# Patient Record
Sex: FEMALE | Race: WHITE | NOT HISPANIC OR LATINO | Employment: UNEMPLOYED | URBAN - METROPOLITAN AREA
[De-identification: names, ages, dates, MRNs, and addresses within clinical notes are randomized per-mention and may not be internally consistent; named-entity substitution may affect disease eponyms.]

---

## 2020-10-16 ENCOUNTER — OFFICE VISIT (OUTPATIENT)
Dept: OBSTETRICS AND GYNECOLOGY | Facility: CLINIC | Age: 50
End: 2020-10-16
Payer: COMMERCIAL

## 2020-10-16 VITALS
BODY MASS INDEX: 18.89 KG/M2 | SYSTOLIC BLOOD PRESSURE: 100 MMHG | HEIGHT: 67 IN | DIASTOLIC BLOOD PRESSURE: 76 MMHG | WEIGHT: 120.38 LBS

## 2020-10-16 DIAGNOSIS — N94.10 DYSPAREUNIA, FEMALE: ICD-10-CM

## 2020-10-16 DIAGNOSIS — N95.1 MENOPAUSE SYNDROME: ICD-10-CM

## 2020-10-16 DIAGNOSIS — R53.83 FATIGUE, UNSPECIFIED TYPE: ICD-10-CM

## 2020-10-16 DIAGNOSIS — N89.8 VAGINAL DRYNESS: ICD-10-CM

## 2020-10-16 DIAGNOSIS — Z78.0 MENOPAUSE: Primary | ICD-10-CM

## 2020-10-16 DIAGNOSIS — R68.82 DECREASED LIBIDO: ICD-10-CM

## 2020-10-16 PROCEDURE — 3008F BODY MASS INDEX DOCD: CPT | Mod: CPTII,S$GLB,, | Performed by: NURSE PRACTITIONER

## 2020-10-16 PROCEDURE — 3008F PR BODY MASS INDEX (BMI) DOCUMENTED: ICD-10-PCS | Mod: CPTII,S$GLB,, | Performed by: NURSE PRACTITIONER

## 2020-10-16 PROCEDURE — 99204 PR OFFICE/OUTPT VISIT, NEW, LEVL IV, 45-59 MIN: ICD-10-PCS | Mod: S$GLB,,, | Performed by: NURSE PRACTITIONER

## 2020-10-16 PROCEDURE — 99204 OFFICE O/P NEW MOD 45 MIN: CPT | Mod: S$GLB,,, | Performed by: NURSE PRACTITIONER

## 2020-10-16 RX ORDER — ESTERIFIED ESTROGEN AND METHYLTESTOSTERONE .625; 1.25 MG/1; MG/1
1 TABLET ORAL DAILY
Qty: 30 TABLET | Refills: 5 | Status: SHIPPED | OUTPATIENT
Start: 2020-10-16 | End: 2020-11-15

## 2020-10-16 RX ORDER — OSPEMIFENE 60 MG/1
60 TABLET, FILM COATED ORAL DAILY
Qty: 90 TABLET | Refills: 3 | Status: SHIPPED | OUTPATIENT
Start: 2020-10-16

## 2020-10-16 RX ORDER — PROGESTERONE 200 MG/1
200 CAPSULE ORAL NIGHTLY
Qty: 30 CAPSULE | Refills: 11 | Status: SHIPPED | OUTPATIENT
Start: 2020-10-16 | End: 2021-10-16

## 2020-10-16 NOTE — PROGRESS NOTES
"  Subjective:       Patient ID: Berta Hampton is a 50 y.o. female.    Chief Complaint:  hormone consult      History of Present Illness  HPI  Hormone Replacement Counseling  Patient presents to discuss hormone replacement therapy. Patient is requesting hormone replacement therapy due to moodiness, no energy, vaginal dryness and decreased libido. The patient has never been taking hormone replacement therapy. Patient denies post-menopausal vaginal bleeding. The patient is sexually active. GYN screening history: last pap: was normal. Patient is hormone deficient due to menopause, which occurred at age 43. The patient currently has symptoms of decreased libido, depression, hot flashes, moodiness, no energy, vaginal dryness, memory loss. Symptoms in the past had included: same.    Current hormone therapy:   none    Previous hormone therapy:   none  Reason for starting HRT: see above  Bleeding in past on HRT: never been on HRT    No outpatient medications have been marked as taking for the 10/16/20 encounter (Office Visit) with Mary Her NP.     Vitals:    10/16/20 0848   BP: 100/76   Weight: 54.6 kg (120 lb 6.4 oz)   Height: 5' 7" (1.702 m)     Past Medical History:   Diagnosis Date    Emphysema lung      Social History     Socioeconomic History    Marital status:      Spouse name: Not on file    Number of children: Not on file    Years of education: Not on file    Highest education level: Not on file   Occupational History    Not on file   Social Needs    Financial resource strain: Not on file    Food insecurity     Worry: Not on file     Inability: Not on file    Transportation needs     Medical: Not on file     Non-medical: Not on file   Tobacco Use    Smoking status: Current Every Day Smoker     Types: Cigarettes   Substance and Sexual Activity    Alcohol use: Yes     Comment: very seldom    Drug use: Never    Sexual activity: Not Currently     Partners: Male     Birth control/protection: " Partner-Vasectomy   Lifestyle    Physical activity     Days per week: Not on file     Minutes per session: Not on file    Stress: Not on file   Relationships    Social connections     Talks on phone: Not on file     Gets together: Not on file     Attends [employer] service: Not on file     Active member of club or organization: Not on file     Attends meetings of clubs or organizations: Not on file     Relationship status: Not on file   Other Topics Concern    Not on file   Social History Narrative    Not on file         GYN & OB History  No LMP recorded (lmp unknown). Patient has had an ablation.   Date of Last Pap: No result found    OB History    Para Term  AB Living   3 3       3   SAB TAB Ectopic Multiple Live Births           3      # Outcome Date GA Lbr Ricardo/2nd Weight Sex Delivery Anes PTL Lv   3 Para      CS-Unspec      2 Para      CS-Unspec      1 Para      CS-Unspec          Review of Systems  Review of Systems   Constitutional: Negative for activity change, appetite change, chills, fatigue and fever.   HENT: Negative for nasal congestion and tinnitus.    Eyes: Negative for visual disturbance.   Respiratory: Negative for cough and shortness of breath.    Cardiovascular: Negative for chest pain and palpitations.   Gastrointestinal: Negative for abdominal pain, bloating, blood in stool, constipation, nausea and vomiting.   Endocrine: Negative for diabetes, hair loss and hot flashes.   Genitourinary: Positive for decreased libido, dyspareunia, hot flashes, vaginal pain and vaginal dryness. Negative for bladder incontinence, dysmenorrhea, dysuria, flank pain, frequency, genital sores, hematuria, menorrhagia, menstrual problem, pelvic pain, urgency, vaginal bleeding, vaginal discharge, urinary incontinence, postcoital bleeding, postmenopausal bleeding and vaginal odor.   Musculoskeletal: Negative for arthralgias, back pain, leg pain and myalgias.   Integumentary:  Negative for rash, acne, hair  changes, mole/lesion, breast mass, nipple discharge, breast skin changes and breast tenderness.   Neurological: Negative for vertigo, syncope, numbness and headaches.   Hematological: Does not bruise/bleed easily.   Psychiatric/Behavioral: Negative for depression and sleep disturbance. The patient is not nervous/anxious.    Breast: Negative for asymmetry, lump, mass, mastodynia, nipple discharge, skin changes and tenderness          Objective:    Physical Exam:   Constitutional: She appears well-developed and well-nourished. She is cooperative.    HENT:   Nose: No epistaxis.      Cardiovascular: Normal rate, regular rhythm and normal heart sounds.     Pulmonary/Chest: Effort normal and breath sounds normal. No respiratory distress.        Abdominal: Soft. Normal appearance and bowel sounds are normal.                Lymphadenopathy:     She has no cervical adenopathy.    Neurological: She is alert.     Psychiatric: Her speech is normal and behavior is normal. Mood, memory, affect, judgment and thought content normal.          Assessment:        1. Menopause    2. Menopause syndrome    3. Fatigue, unspecified type    4. Decreased libido    5. Vaginal dryness    6. Dyspareunia, female                Plan:      Menopause  -     progesterone (PROMETRIUM) 200 MG capsule; Take 1 capsule (200 mg total) by mouth nightly.  Dispense: 30 capsule; Refill: 11    Menopause syndrome  -     estrogens,conjugated,-methyltestosterone 0.625-1.25mg (ESTRATEST HS) 0.625-1.25 mg per tablet; Take 1 tablet by mouth once daily.  Dispense: 30 tablet; Refill: 5    Fatigue, unspecified type  -     estrogens,conjugated,-methyltestosterone 0.625-1.25mg (ESTRATEST HS) 0.625-1.25 mg per tablet; Take 1 tablet by mouth once daily.  Dispense: 30 tablet; Refill: 5    Decreased libido    Vaginal dryness    Dyspareunia, female    Other orders  -     ospemifene (OSPHENA) 60 mg Tab; Take 60 mg by mouth once daily.  Dispense: 90 tablet; Refill:  "3      Discussed the importance of smoking cessation with the patient. Pt was extensively counseled on menopause, including current treatment recommendations.  In particular, recommendation to cease HRT use by age 65 yo was discussed.  Risks associated with continued HRT use (including increased risk of heart disease, cardiac event, breast cancer, stoke, VTE, etc) and limited benefits of use past age 64 yo were reviewed.  Medical history was reviewed and pt does not have contraindications for HRT use other than smoking.  Pt voiced understanding and expressed desire to continue HRT use at her current dose as symptoms off of HRT are "unbearable".  OK to proceed with HRT at this time, however, pt was advised to consider smoking cessation, she has already been RXd meds by PCP for this            "